# Patient Record
Sex: FEMALE | Employment: UNEMPLOYED | URBAN - METROPOLITAN AREA
[De-identification: names, ages, dates, MRNs, and addresses within clinical notes are randomized per-mention and may not be internally consistent; named-entity substitution may affect disease eponyms.]

---

## 2022-12-29 ENCOUNTER — TRANSFERRED RECORDS (OUTPATIENT)
Dept: HEALTH INFORMATION MANAGEMENT | Facility: CLINIC | Age: 52
End: 2022-12-29

## 2023-01-31 ENCOUNTER — TELEPHONE (OUTPATIENT)
Dept: OPHTHALMOLOGY | Facility: CLINIC | Age: 53
End: 2023-01-31
Payer: COMMERCIAL

## 2023-01-31 NOTE — TELEPHONE ENCOUNTER
Patient communicator reached out and [facility] eye clinic received notes.    recommendation to see neuro-ophthalmology for visual disturbances when looking at phones/lights    No objective findings per note to explain symptoms and recommended neuro-ophthalmology for review of any underlying eye conditions.    Ok for first available with neuro-ophthalmology per my review    Note to patient communicator to assist in scheduling    Edwar Britton RN 12:13 PM 02/01/23    ----          [phone] #[phone]     Spoke to patient at 1114    Longstanding blurriness in each eye per patient.    Mostly constant with some improvement at times    No redness  No photophobia  Burning sensation at times    Mildly worsening in past three weeks since last seen at [facility].    Pt states referral should have been received by clinic as [facility] referring to specialist    --referral/notes not scanned in or received by my callback.    Offered to schedule an eye exam this week and pt would like to review specifics of referral before scheduling and hoping to not repeat same work up for eye exams.    Reviewed clinic will reach out to referring clinic to see if able to obtain notes/referral    Reviewed would likely have standard work up as for all new pt's-- likely work up with technician first-- vision, pressure, likely dilation for exam    Will reach out to patient after able to receive referral/notes    Edwar Britton RN 11:31 AM 02/01/23      ---          [phone]    Called cell at [phone]    No answer and unable to leave voicemessage    Edwar Britton RN 5:17 PM 01/31/23             [facility]    Phone Message    May a detailed message be left on voicemail: yes     Reason for Call: Appointment Intake    Referring Provider Name: referral coming  from [facility] in [place].  Diagnosis and/or Symptoms: floaters and blurred vision (not sudden onset)     If calling before 4:30 please call her work phone at [phone]  #[phone]   If after 430 please call her cell phone at   [phone]    Action Taken: Message routed to:  [facility] eye     Travel Screening: Not Applicable

## 2023-02-01 ENCOUNTER — TELEPHONE (OUTPATIENT)
Dept: OPHTHALMOLOGY | Facility: CLINIC | Age: 53
End: 2023-02-01
Payer: COMMERCIAL

## 2023-02-01 NOTE — TELEPHONE ENCOUNTER
[facility]    Phone Message    May a detailed message be left on voicemail: yes     Reason for Call: Other: Pt calling to retun a call she missed from [facility]. Patient does speak English and writer has updated the phone numbers in the chart. Please call Pt back to schedule the emergent Appt. Thank you     Action Taken: Message routed to:  [facility] ([facility] Ophthalmology    Travel Screening: Not Applicable

## 2023-02-01 NOTE — TELEPHONE ENCOUNTER
I called Tali, but she does not speak english and ask we speak to her daughter.     All numbers on file or non working numbers or belong to a high school    If calling before 4:30 please call her work phone at [phone] #[phone]   If after 430 please call her cell phone at   [phone] - I called the number above and she is at work

## 2023-02-02 ENCOUNTER — TRANSCRIBE ORDERS (OUTPATIENT)
Dept: OTHER | Age: 53
End: 2023-02-02

## 2023-02-02 DIAGNOSIS — H53.9 UNSPECIFIED VISUAL DISTURBANCE: Primary | ICD-10-CM

## 2023-02-02 NOTE — TELEPHONE ENCOUNTER
Called and HAKAN Cesar can make an appointment with any neuro ophth    Salome Palmer Communication Facilitator on 2/2/2023 at 9:00 AM

## 2023-02-03 ENCOUNTER — TELEPHONE (OUTPATIENT)
Dept: OPHTHALMOLOGY | Facility: CLINIC | Age: 53
End: 2023-02-03
Payer: COMMERCIAL

## 2023-02-03 NOTE — TELEPHONE ENCOUNTER
Reviewed with neuro-team    Ok for first available, but scheduling out few months with neuro.    With visual complaints Retina specialty may be good option also and likely earlier scheduling    -- pt has been concerned about vision changes    Called and spoke to pt at 1500 2-8-2023 and offered eye exam/work up with retina provider and pt accepted    Scheduled Monday with Dr. Ennis at [facility] location    Pt aware of date/time/location/duration/hospital based clinic/main clinic number    Edwar Britton RN 3:17 PM 02/08/23    --        Notes received yesterday and ok for first available per neuro-team    Will forward to neuro-team for re-review of urgent request for exam per pt request    Edwar Britton RN 3:21 PM 02/03/23                 [facility] Call Center    Phone Message    May a detailed message be left on voicemail: yes     Reason for Call: Appointment Intake    Referring Provider Name: Dr Joanne Crane @ [facility]  Diagnosis and/or Symptoms: Unspecified visual disturbance [H53.9]  neuro-ophth-pt reports blackening of vision OU after loooking at bright lights such as a phone or ceiling fan    Talked to Patient to schedule with Neuro and she said this was urgent and couldn't wait until April, 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

## 2023-02-13 ENCOUNTER — OFFICE VISIT (OUTPATIENT)
Dept: OPHTHALMOLOGY | Facility: CLINIC | Age: 53
End: 2023-02-13
Attending: OPHTHALMOLOGY
Payer: COMMERCIAL

## 2023-02-13 DIAGNOSIS — H35.89: Primary | ICD-10-CM

## 2023-02-13 PROCEDURE — 92250 FUNDUS PHOTOGRAPHY W/I&R: CPT | Mod: 59 | Performed by: OPHTHALMOLOGY

## 2023-02-13 PROCEDURE — 92134 CPTRZ OPH DX IMG PST SGM RTA: CPT | Performed by: OPHTHALMOLOGY

## 2023-02-13 PROCEDURE — G0463 HOSPITAL OUTPT CLINIC VISIT: HCPCS | Mod: 25

## 2023-02-13 PROCEDURE — 99207 FUNDUS AUTOFLUORESCENCE IMAGE (FAF) OU (BOTH EYES): CPT | Mod: 26 | Performed by: OPHTHALMOLOGY

## 2023-02-13 PROCEDURE — 99203 OFFICE O/P NEW LOW 30 MIN: CPT | Mod: GC | Performed by: OPHTHALMOLOGY

## 2023-02-13 RX ORDER — FLUCONAZOLE 150 MG/1
TABLET ORAL
COMMUNITY
Start: 2022-06-28 | End: 2023-02-13

## 2023-02-13 RX ORDER — METRONIDAZOLE 500 MG/1
TABLET ORAL
COMMUNITY
Start: 2022-05-24 | End: 2023-02-13

## 2023-02-13 RX ORDER — HYDROXYZINE HYDROCHLORIDE 25 MG/1
25 TABLET, FILM COATED ORAL
COMMUNITY
Start: 2023-01-16 | End: 2023-02-13

## 2023-02-13 RX ORDER — CYCLOBENZAPRINE HCL 10 MG
TABLET ORAL
COMMUNITY
Start: 2022-07-21 | End: 2023-02-13

## 2023-02-13 ASSESSMENT — REFRACTION_WEARINGRX
OD_SPHERE: -8.50
OS_SPHERE: -8.75
OD_AXIS: 066
SPECS_TYPE: SVL
OD_CYLINDER: +0.50
OS_CYLINDER: +1.25
OS_AXIS: 080

## 2023-02-13 ASSESSMENT — CONF VISUAL FIELD
OS_INFERIOR_NASAL_RESTRICTION: 0
OD_SUPERIOR_NASAL_RESTRICTION: 0
OD_SUPERIOR_TEMPORAL_RESTRICTION: 0
OD_NORMAL: 1
OS_NORMAL: 1
OS_SUPERIOR_TEMPORAL_RESTRICTION: 0
OS_INFERIOR_TEMPORAL_RESTRICTION: 0
OS_SUPERIOR_NASAL_RESTRICTION: 0
OD_INFERIOR_TEMPORAL_RESTRICTION: 0
OD_INFERIOR_NASAL_RESTRICTION: 0
METHOD: COUNTING FINGERS

## 2023-02-13 ASSESSMENT — TONOMETRY
IOP_METHOD: ICARE
OS_IOP_MMHG: 15
OD_IOP_MMHG: 16

## 2023-02-13 ASSESSMENT — VISUAL ACUITY
OD_CC+: -1
METHOD: SNELLEN - LINEAR
OS_CC+: -2
CORRECTION_TYPE: GLASSES
OD_CC: 20/20
OS_CC: 20/25

## 2023-02-13 ASSESSMENT — SLIT LAMP EXAM - LIDS
COMMENTS: NORMAL
COMMENTS: NORMAL

## 2023-02-13 ASSESSMENT — EXTERNAL EXAM - LEFT EYE: OS_EXAM: NORMAL

## 2023-02-13 ASSESSMENT — EXTERNAL EXAM - RIGHT EYE: OD_EXAM: NORMAL

## 2023-02-13 NOTE — NURSING NOTE
Chief Complaints and History of Present Illnesses   Patient presents with     Vision Changes Ou     Chief Complaint(s) and History of Present Illness(es)     Vision Changes Ou            Laterality: both eyes    Onset: gradual    Onset: 1 year ago    Course: gradually worsening    Associated symptoms: floaters.  Negative for dryness and flashes    Treatments tried: no treatments          Comments    Here for vision changes x1 year. Been following up with [facility] and was told it may be her dry eyes contributing to the changes. She tried ATs but felt it made vision worse so she stopped. She does not feel like her eyes are dry. Notes hx of floaters - stable. No flashes. No eye pain.    Navneet Paul COT 1:43 PM February 13, 2023

## 2023-02-13 NOTE — PROGRESS NOTES
I have confirmed the patient's and reviewed Past Medical History, Past Surgical History, Social History, Family History, Problem List, Medication List and agree with Tech note.        CC -   Blurry vision    INTERVAL HISTORY - Initial visit    HAJA -   Tali Vick is a 52 year old female here for evaluation of blurry vision of both eyes. This has been going on over a year. She was told to use artificial tears. She got Systane, but this made things get worse so she stopped using it. She used to have floaters, but now things are just blurry. She does not believe she has floaters as the cause of her vision loss. There was a point in time where she felt like her vision worsened acutely, and it remained blurry afterwards.     She went to [facility] on 12/29/22. They found BCVA 20/20 OU. She had a HVF performed which was unreliable. At that visit, they recorded her visual complaints as blackening of vision in both eyes frank after looking at bright lights. These were occurring in episods lasting several minutes. Her exam was unremarkable. They referred her to us for further evaluation. The initial referral was to neuro-ophth, but as their scheduling is more difficult, it was changed to retina.       PAST MEDICAL HISTORY:  None, takes no medications    PAST OCULAR SURGERY:  Chalazion excision, age 10      RETINAL IMAGING:     OCT Macula 02/13/23  OD: normal  OS: normal    FAF  right eye slightly irregular   Left eye slightly irregular and large area of white without pressure temporally     ASSESSMENT & PLAN    1. Subjective visual disturbance both eyes        2. White without pressure left eye    - large area temporally       return to clinic: 6 months     Trevor Moseley MD  Retina Fellow, PGY6      ATTESTATION:  I have seen and examined the patient with Dr. Moseley and agree with the findings in this note, as well as the interpretations of the diagnostic tests.    Laura Holden MD PhD.  Professor  & Chair